# Patient Record
Sex: FEMALE | Race: WHITE | NOT HISPANIC OR LATINO | Employment: FULL TIME | URBAN - METROPOLITAN AREA
[De-identification: names, ages, dates, MRNs, and addresses within clinical notes are randomized per-mention and may not be internally consistent; named-entity substitution may affect disease eponyms.]

---

## 2023-05-19 ENCOUNTER — OFFICE VISIT (OUTPATIENT)
Dept: OBSTETRICS AND GYNECOLOGY | Facility: CLINIC | Age: 38
End: 2023-05-19
Payer: COMMERCIAL

## 2023-05-19 VITALS — WEIGHT: 119 LBS | BODY MASS INDEX: 19.13 KG/M2 | HEIGHT: 66 IN

## 2023-05-19 DIAGNOSIS — Z01.419 ENCOUNTER FOR WELL WOMAN EXAM WITH ROUTINE GYNECOLOGICAL EXAM: Primary | ICD-10-CM

## 2023-05-19 PROCEDURE — 99385 PREV VISIT NEW AGE 18-39: CPT | Mod: S$GLB,,, | Performed by: OBSTETRICS & GYNECOLOGY

## 2023-05-19 PROCEDURE — 3008F BODY MASS INDEX DOCD: CPT | Mod: CPTII,S$GLB,, | Performed by: OBSTETRICS & GYNECOLOGY

## 2023-05-19 PROCEDURE — 1159F MED LIST DOCD IN RCRD: CPT | Mod: CPTII,S$GLB,, | Performed by: OBSTETRICS & GYNECOLOGY

## 2023-05-19 PROCEDURE — 99385 PR PREVENTIVE VISIT,NEW,18-39: ICD-10-PCS | Mod: S$GLB,,, | Performed by: OBSTETRICS & GYNECOLOGY

## 2023-05-19 PROCEDURE — 1159F PR MEDICATION LIST DOCUMENTED IN MEDICAL RECORD: ICD-10-PCS | Mod: CPTII,S$GLB,, | Performed by: OBSTETRICS & GYNECOLOGY

## 2023-05-19 PROCEDURE — 3008F PR BODY MASS INDEX (BMI) DOCUMENTED: ICD-10-PCS | Mod: CPTII,S$GLB,, | Performed by: OBSTETRICS & GYNECOLOGY

## 2023-05-19 NOTE — PROGRESS NOTES
CC: WELL WOMAN (age 26-39)     Patient Care Team:  Primary Doctor No as PCP - General    NEW PATIENT       HPI:  Patient is a 38 y.o. who presents for her well woman exam today.  History reviewed with patient.   Patient is without complaints or concerns today. Has notice the last 2 months that she had a day of light spotting mid cycle- no pain, no cramping, just light pink/brown when she wiped. Discussed may be some ovulation bleeding and pt agrees it is right at time of ovulation but will continue to watch and if any pain, increased bleeding or other changes then let us know ans we will do ultrasound    CONTRACEPTION: none  GARDASIL: no- declines  HX ABNL PAPS: none    REVIEW OF DATA/ HEALTH MAINTENANCE  LAST ANNUAL:   2022  LAST MMG-never  LAST LABS- pt not sure-maybe 1-2- yrs?    History reviewed. No pertinent past medical history.  SURGICAL HX:   has a past surgical history that includes Adenoidectomy; Tonsillectomy; and [place] tooth extraction.    SOCIAL HX:   reports that she has never smoked. She has never used smokeless tobacco. She reports current alcohol use. She reports that she does not use drugs.    FAMILY HX:  family history includes Breast cancer (age of onset: 59) in her maternal aunt; Breast cancer (age of onset: 62) in her maternal aunt. .    ALLERGIES:  Patient has no known allergies.    No current outpatient medications  ROS:  CONST:  No fever, chills, fatigue or unexpected changes in weight   CV: No chest pain or palpitations   RESP:  No shortness of breath or cough   GI: No abd pain, vomiting, diarrhea, blood in stool, or changes in bowel mvmts   SKIN: No rashes or lesions  MUSCULOSKELETAL: No joint swelling or pain   PSYCH: No changes in mood or insomia   BREASTS: No asymmetry, lumps, pain, nipple discharge, or skin changes   :  No dysuria, urgency, frequency, hematuria or incontinence           No vag dc, itching, odor or dryness           No pelvic pain, dyspareunia, or  "abnormal vaginal bleeding     VITALS:  Height 5' 6" (1.676 m), weight 54 kg (119 lb).  Body mass index is 19.21 kg/m².     PHYSICAL EXAM-  APPEARANCE: Well appearing, in no acute distress.   NECK: Neck symmetric.   CV:  Normal rate   PULM: Normal resp rate, no resp distress, normal resp effort    PSYCH: Normal mood and affect, cooperative   SKIN: No rashes, lesions, or abnormal bruising   LYMPH: No inguinal or axillary adenopathy   ABD: Soft, without tenderness or masses.   BREAST: Symmetrical, no nipple changes, no skin changes, No palpable masses   PELVIC:  VULVA: No lesions. Normal female genitalia.  URETHRAL MEATUS: No masses, no significant prolapse.   BLADDER/ URETHRA: No masses or suprapubic tenderness   VAGINA: No lesions or erythema, No discharge.   CVX: No lesions,no cervical motion tenderness.   UTERUS: Normal size/shape, mobile, non-tender   ADNEXA: No masses, tenderness, or fullness.   ANUS/ PERINEUM: Normal tone.  No lesions.     *female chaperone present for entire exam      ASSESSMENT and PLAN:  Encounter for well woman exam with routine gynecological exam  -     Liquid-based pap smear, screening       FOLLOWUP:  1 yr for wwe or sooner prn    COUNSELING:  Patient was counseled today on recommendation for yearly pelvic exams, current Pap guidelines, self breast exams, contraception, and recommendations for annual screening mammograms at age 40. Encouraged patient to see her PCP for other health maintenance.       "

## 2023-05-23 ENCOUNTER — TELEPHONE (OUTPATIENT)
Dept: OBSTETRICS AND GYNECOLOGY | Facility: CLINIC | Age: 38
End: 2023-05-23
Payer: COMMERCIAL

## 2023-05-23 LAB — Lab: NORMAL

## 2023-05-23 NOTE — PROGRESS NOTES
Please let patient know her pap and hpv are both negative    Please encourage her to set up the [employer] adelaide if she has not done so

## 2023-05-23 NOTE — TELEPHONE ENCOUNTER
----- Message from Petra Lacy MD sent at 5/23/2023 12:05 PM CDT -----  Please let patient know her pap and hpv are both negative    Please encourage her to set up the [employer] adelaide if she has not done so

## 2023-05-23 NOTE — TELEPHONE ENCOUNTER
Spoke with patient. Two pt identifiers confirmed. Notified patient of pap results. Patient verbalized understanding.

## 2024-05-29 NOTE — PROGRESS NOTES
"CC: WELL WOMAN (age 40-44)    Patient Care Team:  [place], Primary Doctor as PCP - General    HPI:  Patient is a 39 y.o. who presents for her well woman exam today.  History reviewed with patient. Cycles still very regular but the last two months has had a couple days light spotting after.  Also started having acne last month   Patient is without complaints or concerns today.     Last visit with me was on  2023 for wwe    CONTRACEPTION:  none  GARDASIL: no- declines  HX ABNL PAPS: none    REVIEW OF PRIOR DATA/ HEALTH MAINTENANCE:  LAST ANNUAL:   May 19 2023   LAST MMG- never      History reviewed. No pertinent past medical history.  SURGICAL HX:   has a past surgical history that includes Adenoidectomy; Tonsillectomy; and [place] tooth extraction.    SOCIAL HX:    reports that she has never smoked. She has never used smokeless tobacco. She reports current alcohol use. She reports that she does not use drugs.    No current outpatient medications    VITALS:  Blood pressure 122/75, height 5' 6" (1.676 m), weight 57.2 kg (126 lb), last menstrual period 2024.  Body mass index is 20.34 kg/m².     PHYSICAL EXAM-  APPEARANCE: Well appearing, in no acute distress.  CV/ PULM: no resp distress, normal resp effort  PSYCH: Normal mood and affect, cooperative  SKIN: No rashes, lesions, or abnormal bruising  ABD: Soft, no masses, tenderness, or distension  BREASTS: No skin changes,nipple dc. No palpable masses or tenderness  PELVIC:  VULVA: Normal female genitalia. No lesions  BLADDER: No suprapubic tenderness  VAGINA/ CVX:  No lesions, no d/c  UTERUS: mobile, non-tender  ADNEXA: No masses, tenderness, or fullness.  ANUS/ PERINEUM: Normal tone.  No lesions.           *patient verbally consented for exam and female chaperone present for entire exam    ASSESSMENT and PLAN:  Encounter for well woman exam with routine gynecological exam  -     Liquid-based pap smear, screening    Breast cancer screening by mammogram  -     " Mammo Digital Screening Bilat w/ Trevor; Future; Expected date: 06/12/2024       FOLLOWUP:  1 year for wwe or sooner prn    COUNSELING:  Patient was counseled today on recommendation for yearly pelvic exam, current Pap guidelines, self breast exams, contraception, recommendations for annual screening mammograms, and routine screening colonoscopy at age 45.  Encouraged patient to see her PCP for other health maintenance.

## 2024-05-31 ENCOUNTER — OFFICE VISIT (OUTPATIENT)
Dept: OBSTETRICS AND GYNECOLOGY | Facility: CLINIC | Age: 39
End: 2024-05-31
Payer: COMMERCIAL

## 2024-05-31 VITALS
WEIGHT: 126 LBS | DIASTOLIC BLOOD PRESSURE: 75 MMHG | BODY MASS INDEX: 20.25 KG/M2 | HEIGHT: 66 IN | SYSTOLIC BLOOD PRESSURE: 122 MMHG

## 2024-05-31 DIAGNOSIS — Z12.31 BREAST CANCER SCREENING BY MAMMOGRAM: ICD-10-CM

## 2024-05-31 DIAGNOSIS — Z01.419 ENCOUNTER FOR WELL WOMAN EXAM WITH ROUTINE GYNECOLOGICAL EXAM: Primary | ICD-10-CM

## 2024-05-31 PROCEDURE — 3078F DIAST BP <80 MM HG: CPT | Mod: CPTII,S$GLB,, | Performed by: OBSTETRICS & GYNECOLOGY

## 2024-05-31 PROCEDURE — 1159F MED LIST DOCD IN RCRD: CPT | Mod: CPTII,S$GLB,, | Performed by: OBSTETRICS & GYNECOLOGY

## 2024-05-31 PROCEDURE — 3008F BODY MASS INDEX DOCD: CPT | Mod: CPTII,S$GLB,, | Performed by: OBSTETRICS & GYNECOLOGY

## 2024-05-31 PROCEDURE — 99396 PREV VISIT EST AGE 40-64: CPT | Mod: S$GLB,,, | Performed by: OBSTETRICS & GYNECOLOGY

## 2024-05-31 PROCEDURE — 3074F SYST BP LT 130 MM HG: CPT | Mod: CPTII,S$GLB,, | Performed by: OBSTETRICS & GYNECOLOGY

## 2024-06-04 LAB — Lab: NORMAL

## 2024-06-04 NOTE — PROGRESS NOTES
Please let patient know that her pap smear is negative and we will see her in a year for her annual exam.     Also encourage her to download the [employer] adelaide if she has not done so

## 2024-08-01 ENCOUNTER — DOCUMENTATION ONLY (OUTPATIENT)
Dept: OBSTETRICS AND GYNECOLOGY | Facility: CLINIC | Age: 39
End: 2024-08-01
Payer: COMMERCIAL

## 2024-10-24 DIAGNOSIS — R10.2 PELVIC PAIN: Primary | ICD-10-CM

## 2024-10-25 ENCOUNTER — PROCEDURE VISIT (OUTPATIENT)
Dept: OBSTETRICS AND GYNECOLOGY | Facility: CLINIC | Age: 39
End: 2024-10-25
Payer: COMMERCIAL

## 2024-10-25 DIAGNOSIS — R10.2 PELVIC PAIN: ICD-10-CM

## 2025-06-11 NOTE — PROGRESS NOTES
"CC: WELL WOMAN (age 40-44)    Patient Care Team:  [place], Primary Doctor as PCP - General        HPI:  Patient is a 40 y.o. who presents for her well woman exam today.  History reviewed with patient.   Patient reports cycles still regular but has noticed some of her pms symptoms have intensified. Not sure if bc no longer on ocps, if she is getting into perimenopause, stress from going back to work, etc.  Wants to check and see if she is starting into menopause    CONTRACEPTION:  none  GARDASIL: no- declines  HX ABNL PAPS: none    REVIEW OF PRIOR DATA/ HEALTH MAINTENANCE:  LAST ANNUAL:   [place] 3 2024   LAST MMG- 2024-  [facility]          History reviewed. No pertinent past medical history.  SURGICAL HX:   has a past surgical history that includes Adenoidectomy; Tonsillectomy; and [place] tooth extraction.    SOCIAL HX:    reports that she has never smoked. She has never used smokeless tobacco. She reports current alcohol use. She reports that she does not use drugs.    No current outpatient medications  VITALS:  Blood pressure 119/74, height 5' 6" (1.676 m), weight 59 kg (130 lb), last menstrual period 2025.  Body mass index is 20.98 kg/m².     PHYSICAL EXAM-  APPEARANCE: Well appearing, in no acute distress.  CV/ PULM: no resp distress, normal resp effort  PSYCH: Normal mood and affect, cooperative  SKIN: No rashes, lesions, or abnormal bruising  ABD: Soft, no masses, tenderness, or distension  BREASTS: No skin changes,nipple dc. No palpable masses or tenderness  PELVIC:  VULVA: Normal female genitalia. No lesions  BLADDER: No suprapubic tenderness  VAGINA/ CVX:  No lesions, no d/c  UTERUS: mobile, non-tender  ADNEXA: No masses, tenderness, or fullness.  ANUS/ PERINEUM: Normal tone.  No lesions.           *patient verbally consented for exam and female chaperone present for entire exam    ASSESSMENT and PLAN:  Encounter for well woman exam with routine gynecological exam  -     Liquid-based pap smear, " screening  -     CBC Auto Differential; Future  -     Comprehensive Metabolic Panel; Future  -     Lipid Panel; Future  -     TSH; Future  -     T4, Free; Future    Breast cancer screening by mammogram  -     Mammo Digital Screening Bilat w/ Trevor (XPD); Future; Expected date: 06/25/2025    PMS (premenstrual syndrome)    Mood changes  -     Follicle Stimulating Hormone; Future       FOLLOWUP:  1 year for wwe or sooner prn    COUNSELING:  Patient was counseled today on recommendation for yearly pelvic exam, current Pap guidelines, self breast exams, contraception, recommendations for annual screening mammograms, and routine screening colonoscopy at age 45.  Encouraged patient to see her PCP for other health maintenance.

## 2025-06-12 ENCOUNTER — OFFICE VISIT (OUTPATIENT)
Dept: OBSTETRICS AND GYNECOLOGY | Facility: CLINIC | Age: 40
End: 2025-06-12
Payer: COMMERCIAL

## 2025-06-12 VITALS
DIASTOLIC BLOOD PRESSURE: 74 MMHG | WEIGHT: 130 LBS | HEIGHT: 66 IN | BODY MASS INDEX: 20.89 KG/M2 | SYSTOLIC BLOOD PRESSURE: 119 MMHG

## 2025-06-12 DIAGNOSIS — N94.3 PMS (PREMENSTRUAL SYNDROME): ICD-10-CM

## 2025-06-12 DIAGNOSIS — R45.86 MOOD CHANGES: ICD-10-CM

## 2025-06-12 DIAGNOSIS — Z12.31 BREAST CANCER SCREENING BY MAMMOGRAM: ICD-10-CM

## 2025-06-12 DIAGNOSIS — Z01.419 ENCOUNTER FOR WELL WOMAN EXAM WITH ROUTINE GYNECOLOGICAL EXAM: Primary | ICD-10-CM

## 2025-06-12 PROCEDURE — 3074F SYST BP LT 130 MM HG: CPT | Mod: CPTII,,, | Performed by: OBSTETRICS & GYNECOLOGY

## 2025-06-12 PROCEDURE — 3078F DIAST BP <80 MM HG: CPT | Mod: CPTII,,, | Performed by: OBSTETRICS & GYNECOLOGY

## 2025-06-12 PROCEDURE — 1159F MED LIST DOCD IN RCRD: CPT | Mod: CPTII,,, | Performed by: OBSTETRICS & GYNECOLOGY

## 2025-06-12 PROCEDURE — 3008F BODY MASS INDEX DOCD: CPT | Mod: CPTII,,, | Performed by: OBSTETRICS & GYNECOLOGY

## 2025-06-12 PROCEDURE — 99396 PREV VISIT EST AGE 40-64: CPT | Mod: S$PBB,,, | Performed by: OBSTETRICS & GYNECOLOGY

## 2025-06-14 LAB — Lab: NORMAL

## 2025-06-16 ENCOUNTER — RESULTS FOLLOW-UP (OUTPATIENT)
Dept: OBSTETRICS AND GYNECOLOGY | Facility: CLINIC | Age: 40
End: 2025-06-16

## 2025-06-17 ENCOUNTER — TELEPHONE (OUTPATIENT)
Dept: OBSTETRICS AND GYNECOLOGY | Facility: CLINIC | Age: 40
End: 2025-06-17
Payer: COMMERCIAL

## 2025-06-17 NOTE — TELEPHONE ENCOUNTER
----- Message from Petra Lacy MD sent at 6/16/2025 11:07 PM CDT -----  Please let patient know that her pap smear is negative and we will see her in a year for her annual exam.     Also encourage her to download the [employer] adelaide if she has not done so  ----- Message -----  From: Lydia, Lab In [facility]  Sent: 6/14/2025   5:31 PM CDT  To: Petra Lacy MD    
98.1

## 2025-06-25 ENCOUNTER — TELEPHONE (OUTPATIENT)
Dept: OBSTETRICS AND GYNECOLOGY | Facility: CLINIC | Age: 40
End: 2025-06-25
Payer: COMMERCIAL

## 2025-06-25 DIAGNOSIS — Z76.89 ENCOUNTER TO ESTABLISH CARE: Primary | ICD-10-CM

## 2025-06-25 NOTE — TELEPHONE ENCOUNTER
Copied from CRM #[id number]. Topic: General Inquiry - Return Call  >> Jun 24, 2025  4:20 PM Carolyn wrote:  Type:  Patient Returning Call    Who Called:Sugar Fagan   Who Left Message for Patient:Nurse Macedo  Does the patient know what this is regarding?:unsure  Would the patient rather a call back or a response via [facility]? Call back  Best Call Back Number:[phone]  Thanks!

## 2025-08-06 ENCOUNTER — TELEPHONE (OUTPATIENT)
Dept: OBSTETRICS AND GYNECOLOGY | Facility: CLINIC | Age: 40
End: 2025-08-06
Payer: COMMERCIAL

## 2025-08-06 NOTE — TELEPHONE ENCOUNTER
----- Message from Petra Lacy MD sent at 8/5/2025 10:05 PM CDT -----  Please let patient know I reviewed her addtl views look normal. I recommend her getting her next screening mmg in 1 year    Also encourage her to download the [employer] adelaide if she is not already on it  ----- Message -----  From: Oscar Mansfield LPN  Sent: 8/5/2025   4:31 PM CDT  To: Petra Lacy MD